# Patient Record
Sex: MALE | Race: WHITE | URBAN - METROPOLITAN AREA
[De-identification: names, ages, dates, MRNs, and addresses within clinical notes are randomized per-mention and may not be internally consistent; named-entity substitution may affect disease eponyms.]

---

## 2017-01-23 ENCOUNTER — OFFICE VISIT (OUTPATIENT)
Dept: DERMATOLOGY | Age: 47
End: 2017-01-23

## 2017-01-23 DIAGNOSIS — L57.0 AK (ACTINIC KERATOSIS): ICD-10-CM

## 2017-01-23 DIAGNOSIS — D22.9 MULTIPLE NEVI: Primary | ICD-10-CM

## 2017-01-23 PROCEDURE — 17003 DESTRUCT PREMALG LES 2-14: CPT | Performed by: DERMATOLOGY

## 2017-01-23 PROCEDURE — 17000 DESTRUCT PREMALG LESION: CPT | Performed by: DERMATOLOGY

## 2017-01-23 PROCEDURE — 99213 OFFICE O/P EST LOW 20 MIN: CPT | Performed by: DERMATOLOGY

## 2018-03-20 ENCOUNTER — TELEPHONE (OUTPATIENT)
Dept: DERMATOLOGY | Age: 48
End: 2018-03-20

## 2018-03-22 ENCOUNTER — OFFICE VISIT (OUTPATIENT)
Dept: DERMATOLOGY | Age: 48
End: 2018-03-22

## 2018-03-22 DIAGNOSIS — D48.5 NEOPLASM OF UNCERTAIN BEHAVIOR OF SKIN: Primary | ICD-10-CM

## 2018-03-22 DIAGNOSIS — L57.0 AK (ACTINIC KERATOSIS): ICD-10-CM

## 2018-03-22 PROCEDURE — 11100 PR BIOPSY OF SKIN LESION: CPT | Performed by: DERMATOLOGY

## 2018-03-22 PROCEDURE — 11101 PR BIOPSY, EACH ADDED LESION: CPT | Performed by: DERMATOLOGY

## 2018-03-22 PROCEDURE — 17000 DESTRUCT PREMALG LESION: CPT | Performed by: DERMATOLOGY

## 2018-03-22 NOTE — PROGRESS NOTES
[facility]  Veronica Almaguer Fraction  1970    50 y.o. male     Date of Visit: 3/22/2018    Chief Complaint: lesions  Chief Complaint   Patient presents with    Skin Lesion     lt upper arm x 3 + check scalp     Last seen: 1-2017    History of Present Illness:    1. Here for evaluation of 3 papules on the L upper arm that he noticed in the past few weeks. Asx. He believes that these are new. 2. He also has a rough lesion on the upper FH.  asx. Hx of AK in the past.    3. He notes a few lesions on the scalp that he would like to have checked. No hx of skin cancer. No family hx of melanoma. Review of Systems:  Gen: Feels well, good sense of health. Skin: No changing moles or lesions. Past Medical History, Family History, Surgical History, Medications and Allergies reviewed. History reviewed. No pertinent past medical history. History reviewed. No pertinent surgical history. No outpatient prescriptions have been marked as taking for the 3/22/18 encounter (Office Visit) with Emilee Gates MD.       No Known Allergies      Physical Examination     Gen, NAD  The following were examined and determined to be normal: Psych/Neuro, Scalp/hair, Conjunctivae/eyelids, Gums/teeth/lips, Neck    The following were examined and determined to be abnormal: face, LUE    L upper outer arm with 3 pinkish-tan dome-shaped papules  Upper central FH with faintly erythematous roughly scaled macule             Assessment and Plan     1. r/o irritated nevus - L upper arm (proximal - A)  r/o sk vs nevus - L upper arm (distal - B)  - 2 Shave biopsy performed after verbal consent obtained. Patient educated regarding risk of bleeding, infection, scar and educated on wound care. Skin cleansed with alcohol pad and site anesthetized with lido + epi. Aluminum chloride applied to site for hemostasis. Petrolatum ointment and bandage applied.   Specimen bottle labeled with patient information and site and specimen sent to [facility]. 2. AK - upper central FH - 1  - 1 lesion(s) on the above areas treated with liquid nitrogen x 2 cycles. Patient educated on risk of blister, hypopigmentation/scar and wound care.    - sun protection

## 2018-03-22 NOTE — PATIENT INSTRUCTIONS
thin film of white petrolatum (Vaseline©).  You do not need to cover the area, but can if you prefer.  Do NOT allow the site to become dry or crusted, or attempt to dry it out with rubbing alcohol or hydrogen peroxide.  Continue this regimen until the area is pink and healed. Depending on the size and location of your cryosurgery site, healing may take 2 to 4 weeks.  The area may continue to be pink for several weeks, and over the next few months may become darker or lighter than the surrounding skin. This may be a permanent change.

## 2018-04-04 ENCOUNTER — TELEPHONE (OUTPATIENT)
Dept: DERMATOLOGY | Age: 48
End: 2018-04-04

## 2019-01-24 ENCOUNTER — WALK IN (OUTPATIENT)
Dept: URGENT CARE | Age: 49
End: 2019-01-24

## 2019-01-24 VITALS
TEMPERATURE: 97.7 F | WEIGHT: 202 LBS | SYSTOLIC BLOOD PRESSURE: 128 MMHG | RESPIRATION RATE: 18 BRPM | BODY MASS INDEX: 28.92 KG/M2 | HEIGHT: 70 IN | DIASTOLIC BLOOD PRESSURE: 80 MMHG | HEART RATE: 76 BPM

## 2019-01-24 DIAGNOSIS — J20.8 ACUTE VIRAL BRONCHITIS: Primary | ICD-10-CM

## 2019-01-24 PROCEDURE — 99203 OFFICE O/P NEW LOW 30 MIN: CPT | Performed by: NURSE PRACTITIONER

## 2019-01-24 RX ORDER — BENZONATATE 100 MG/1
CAPSULE ORAL
Qty: 20 CAPSULE | Refills: 0 | Status: SHIPPED | OUTPATIENT
Start: 2019-01-24 | End: 2019-02-28 | Stop reason: ALTCHOICE

## 2019-01-24 RX ORDER — ALBUTEROL SULFATE 90 UG/1
AEROSOL, METERED RESPIRATORY (INHALATION)
Qty: 1 INHALER | Refills: 0 | Status: SHIPPED | OUTPATIENT
Start: 2019-01-24 | End: 2019-02-28 | Stop reason: ALTCHOICE

## 2019-01-24 ASSESSMENT — ENCOUNTER SYMPTOMS
SHORTNESS OF BREATH: 1
CHILLS: 0
SINUS PRESSURE: 0
GASTROINTESTINAL NEGATIVE: 1
WHEEZING: 0
FEVER: 0
COUGH: 1
CHEST TIGHTNESS: 1
SORE THROAT: 0
RHINORRHEA: 0
HEADACHES: 0
FATIGUE: 1

## 2019-02-28 ENCOUNTER — OFFICE VISIT (OUTPATIENT)
Dept: FAMILY MEDICINE | Age: 49
End: 2019-02-28

## 2019-02-28 VITALS
BODY MASS INDEX: 28.14 KG/M2 | HEART RATE: 68 BPM | WEIGHT: 201 LBS | SYSTOLIC BLOOD PRESSURE: 120 MMHG | HEIGHT: 71 IN | DIASTOLIC BLOOD PRESSURE: 90 MMHG

## 2019-02-28 DIAGNOSIS — R10.9 ABDOMINAL DISCOMFORT: Primary | ICD-10-CM

## 2019-02-28 DIAGNOSIS — Z12.11 COLON CANCER SCREENING: ICD-10-CM

## 2019-02-28 PROCEDURE — 99203 OFFICE O/P NEW LOW 30 MIN: CPT | Performed by: FAMILY MEDICINE

## 2019-02-28 ASSESSMENT — ENCOUNTER SYMPTOMS
RESPIRATORY NEGATIVE: 1
ROS GI COMMENTS: ABDOMINAL DISCOMFORT
NEUROLOGICAL NEGATIVE: 1
CONSTITUTIONAL NEGATIVE: 1

## 2019-02-28 ASSESSMENT — PATIENT HEALTH QUESTIONNAIRE - PHQ9
SUM OF ALL RESPONSES TO PHQ9 QUESTIONS 1 AND 2: 0
1. LITTLE INTEREST OR PLEASURE IN DOING THINGS: NOT AT ALL
2. FEELING DOWN, DEPRESSED OR HOPELESS: NOT AT ALL
SUM OF ALL RESPONSES TO PHQ9 QUESTIONS 1 AND 2: 0

## 2019-03-01 ENCOUNTER — LAB SERVICES (OUTPATIENT)
Dept: LAB | Age: 49
End: 2019-03-01

## 2019-03-01 DIAGNOSIS — R10.9 ABDOMINAL DISCOMFORT: ICD-10-CM

## 2019-03-01 LAB
CRP SERPL-MCNC: <0.3 MG/DL
ERYTHROCYTE [SEDIMENTATION RATE] IN BLOOD: 10 MM/HR (ref 0–20)

## 2019-03-01 PROCEDURE — 85652 RBC SED RATE AUTOMATED: CPT | Performed by: FAMILY MEDICINE

## 2019-03-01 PROCEDURE — 83014 H PYLORI DRUG ADMIN: CPT | Performed by: FAMILY MEDICINE

## 2019-03-01 PROCEDURE — 86140 C-REACTIVE PROTEIN: CPT | Performed by: FAMILY MEDICINE

## 2019-03-01 PROCEDURE — 82784 ASSAY IGA/IGD/IGG/IGM EACH: CPT | Performed by: FAMILY MEDICINE

## 2019-03-01 PROCEDURE — 36415 COLL VENOUS BLD VENIPUNCTURE: CPT | Performed by: FAMILY MEDICINE

## 2019-03-01 PROCEDURE — 83013 H PYLORI (C-13) BREATH: CPT | Performed by: FAMILY MEDICINE

## 2019-03-01 PROCEDURE — 83516 IMMUNOASSAY NONANTIBODY: CPT | Performed by: FAMILY MEDICINE

## 2019-03-02 LAB — UREA BREATH TEST QL: NEGATIVE

## 2019-03-03 LAB
IGA SERPL-MCNC: 280 MG/DL (ref 82–453)
TTG IGA SER-ACNC: 5 UNITS

## 2019-03-04 ENCOUNTER — TELEPHONE (OUTPATIENT)
Dept: FAMILY MEDICINE | Age: 49
End: 2019-03-04

## 2019-09-03 ENCOUNTER — TELEPHONE (OUTPATIENT)
Dept: FAMILY MEDICINE | Age: 49
End: 2019-09-03

## 2021-05-03 ENCOUNTER — OFFICE VISIT (OUTPATIENT)
Dept: DERMATOLOGY | Age: 51
End: 2021-05-03
Payer: COMMERCIAL

## 2021-05-03 VITALS — TEMPERATURE: 97.9 F

## 2021-05-03 DIAGNOSIS — Q82.5 NEVUS FLAMMEUS: ICD-10-CM

## 2021-05-03 DIAGNOSIS — L57.0 AK (ACTINIC KERATOSIS): ICD-10-CM

## 2021-05-03 DIAGNOSIS — L82.1 SEBORRHEIC KERATOSIS: ICD-10-CM

## 2021-05-03 DIAGNOSIS — D22.9 MULTIPLE NEVI: Primary | ICD-10-CM

## 2021-05-03 PROCEDURE — 99203 OFFICE O/P NEW LOW 30 MIN: CPT | Performed by: DERMATOLOGY

## 2021-05-03 NOTE — PROGRESS NOTES
today  - educ re si/sx/ABCD's of MM   educ sun protection - OTC sunscreen with SPF 30-50+ recommended and reviewed usage  encouraged skin check yearly (sooner if indicated), self checks    3.  Nevus flammeus - occipital scalp  - Reassured regarding benign nature and chronicity  -No treatment needed but call if any other symptoms of inflammation-itching, scaling, worsening erythema or papules appearing

## 2022-05-24 ENCOUNTER — OFFICE VISIT (OUTPATIENT)
Dept: DERMATOLOGY | Age: 52
End: 2022-05-24
Payer: COMMERCIAL

## 2022-05-24 DIAGNOSIS — D22.9 MULTIPLE NEVI: Primary | ICD-10-CM

## 2022-05-24 DIAGNOSIS — L57.0 AK (ACTINIC KERATOSIS): ICD-10-CM

## 2022-05-24 DIAGNOSIS — L60.3 NAIL DYSTROPHY: ICD-10-CM

## 2022-05-24 DIAGNOSIS — Q82.5 NEVUS FLAMMEUS: ICD-10-CM

## 2022-05-24 DIAGNOSIS — L82.1 SEBORRHEIC KERATOSIS: ICD-10-CM

## 2022-05-24 PROCEDURE — 99213 OFFICE O/P EST LOW 20 MIN: CPT | Performed by: DERMATOLOGY

## 2022-05-24 RX ORDER — SUMATRIPTAN 100 MG/1
TABLET, FILM COATED ORAL
COMMUNITY
Start: 2022-01-03

## 2022-05-24 NOTE — PROGRESS NOTES
[facility]  Any Pizano MD  [phone]      Sherrie Hernández  1970    46 y.o. male     Date of Visit: 5/24/2022    Chief Complaint: lesions, moles  Chief Complaint   Patient presents with    Skin Exam     FSE     HX: Multi Nevi, Neoplasm/skin     Last seen: 5-2021    History of Present Illness:    1. Here for evaluation of multiple asx pigmented lesions on the trunk and extremities, present for many years; no change in size/shape/color of any lesions; no bleeding lesions. He had a few lesions on the left upper arm biopsied in 2018 -both read as dermal nevi. 2. hx of AK on the upper FH + other in the past.  No concerns with the site or new concerns. 3. Previously noticed an erythematous area on the occipital scalp that his family noticed with shorter haircuts, c.w nevus flammeus. No concerns. 4. He notes a tan spot on the R cheekbone that he would like checked. Asx. No changes. Believes something in this area may have been biopsied or treated with Ln2 in the past but not sure if same exact spot. Notes reviewed - no bx done and no scar visible; only noted to have AK in 2018 on the R zygoma. 5. C/o changes in the nails x 6 mos. Denies new medications, no wet work or chemical exposure. No personal or family hx of psoriasis. No joint pains. No hx of skin cancer. No family hx of melanoma. Typically wears sunscreen/covers up but got a sunburn this weekend. Review of Systems:  Gen: Feels well, good sense of health. Skin: No changing moles or lesions. Past Medical History, Family History, Surgical History, Medications and Allergies reviewed. No past medical history on file. No past surgical history on file. Outpatient Medications Marked as Taking for the 5/24/22 encounter (Office Visit) with Abiel Carrera MD   Medication Sig Dispense Refill    SUMAtriptan (IMITREX) 100 MG tablet Take one tablet at onset migraine.   May repeat in two hours.  Not to exceed 2 tablets/24 hours.  multivitamin (THERAGRAN) per tablet Take 1 tablet by mouth daily.  dextromethorphan-guaiFENesin (MUCINEX DM)  MG per SR tablet Take 1 tablet by mouth daily. No Known Allergies      Physical Examination     Gen, NAD  Full skin exam was done today except for underwear covered areas    trunk and extremities with scattered brown macules and papules   No AK's  Occipital scalp scalp extending to the hairline is a triangle shaped erythematous blanchable patch; no other papules, pustules, scale or evidence of inflammation   R zygoma with subtle tan macule  fingersnails with distal onycholysis and onychoschizia  Toenails with focal mild onycholysis                        Assessment and Plan     1. Benign-appearing nevi and SK's  2. AK's - clear today  - educ re si/sx/ABCD's of MM   Cont sun protection - OTC sunscreen with SPF 30-50+ recommended and reviewed usage  encouraged skin check yearly (sooner if indicated), self checks    3. Nevus flammeus - occipital scalp  - Reassured regarding benign nature and chronicity  -No treatment needed but call if any other symptoms of inflammation-itching, scaling, worsening erythema or papules appearing    4. Nail dystophy - ?  Nail psoriasis  - ed chronicity  - reassured and discussed potential etiology  - advised trimming, no picking  - start topical OTC keratin trx - rescuerxx - but discussed expectations  - if worsening or any other developments c/w psoriasis or PsA, then rtn

## 2022-05-24 NOTE — PATIENT INSTRUCTIONS
CND RescueRxx keratin treatment  Biotin - a supplement that helps with nails      Protecting Yourself From the Sun    · Apply an over-the-counter broad spectrum water resistant sunscreen with an SPF of at least 30 to exposed areas of the skin. Dont forget the ears and lips! Remember to reapply sunscreen about every 2 hours and after swimming or sweating. · Wear sun protective clothing. Swim shirts (aka. rash guards) are a great idea and negates the need to reapply sunscreen in those areas.      · Seek the shade whenever possible especially between the hours of 10 am and 4 pm when the suns rays are the strongest.     · Avoid tanning beds  ·

## 2023-08-22 ENCOUNTER — OFFICE VISIT (OUTPATIENT)
Dept: DERMATOLOGY | Age: 53
End: 2023-08-22
Payer: COMMERCIAL

## 2023-08-22 DIAGNOSIS — L82.1 SEBORRHEIC KERATOSIS: ICD-10-CM

## 2023-08-22 DIAGNOSIS — Q82.5 NEVUS FLAMMEUS: ICD-10-CM

## 2023-08-22 DIAGNOSIS — L60.3 NAIL DYSTROPHY: ICD-10-CM

## 2023-08-22 DIAGNOSIS — L57.0 AK (ACTINIC KERATOSIS): ICD-10-CM

## 2023-08-22 DIAGNOSIS — D22.9 MULTIPLE NEVI: Primary | ICD-10-CM

## 2023-08-22 PROCEDURE — 1036F TOBACCO NON-USER: CPT | Performed by: DERMATOLOGY

## 2023-08-22 PROCEDURE — 99213 OFFICE O/P EST LOW 20 MIN: CPT | Performed by: DERMATOLOGY

## 2023-08-22 PROCEDURE — 3017F COLORECTAL CA SCREEN DOC REV: CPT | Performed by: DERMATOLOGY

## 2023-08-22 PROCEDURE — G8421 BMI NOT CALCULATED: HCPCS | Performed by: DERMATOLOGY

## 2023-08-22 PROCEDURE — G8427 DOCREV CUR MEDS BY ELIG CLIN: HCPCS | Performed by: DERMATOLOGY

## 2023-08-22 PROCEDURE — 17000 DESTRUCT PREMALG LESION: CPT | Performed by: DERMATOLOGY

## 2023-08-22 NOTE — PROGRESS NOTES
[facility]  Melanie Vora MD  [phone]      Shy Dominguez  1970    45 y.o. male     Date of Visit: 8/22/2023    Chief Complaint: lesions, moles  Chief Complaint   Patient presents with    Skin Exam     Last seen: 5-2022    History of Present Illness:    1. Here for evaluation of multiple asx pigmented lesions on the trunk and extremities, present for many years; no change in size/shape/color of any lesions; no bleeding lesions. He had a few lesions on the left upper arm biopsied in 2018 -both read as dermal nevi. 2. hx of AK on the upper FH + other in the past.  No concerns with this site but one new on the vertex scalp. Asx. 3. Previously noticed an erythematous area on the occipital scalp that his family noticed with shorter haircuts, c.w nevus flammeus. No concerns. 4. Lentigo on the R cheekbone that we have monitored. Asx. No changes. He previously noted that he thought something in this area may have been biopsied or treated with Ln2 in the past but not sure if same exact spot. Notes reviewed - no bx done and no scar visible; only noted to have AK in 2018 on the R zygoma. 5. C/o changes in the nails since early 2022. Denies new medications, no wet work or chemical exposure. No personal or family hx of psoriasis. No joint pains. Better with use of over-the-counter topical keratin treatment and vitamin. No hx of skin cancer. No family hx of melanoma. Typically wears sunscreen/covers up but got a sunburn this weekend. Review of Systems:  Gen: Feels well, good sense of health. Skin: No changing moles or lesions. Past Medical History, Family History, Surgical History, Medications and Allergies reviewed. History reviewed. No pertinent past medical history. History reviewed. No pertinent surgical history.     No outpatient medications have been marked as taking for the 8/22/23 encounter (Office Visit) with Mima Loaiza MD.

## 2024-08-26 ENCOUNTER — OFFICE VISIT (OUTPATIENT)
Dept: DERMATOLOGY | Age: 54
End: 2024-08-26
Payer: COMMERCIAL

## 2024-08-26 DIAGNOSIS — L57.0 AK (ACTINIC KERATOSIS): ICD-10-CM

## 2024-08-26 DIAGNOSIS — L81.4 LENTIGINES: ICD-10-CM

## 2024-08-26 DIAGNOSIS — L82.1 SEBORRHEIC KERATOSIS: ICD-10-CM

## 2024-08-26 DIAGNOSIS — T63.444A BEE STING REACTION, UNDETERMINED INTENT, INITIAL ENCOUNTER: ICD-10-CM

## 2024-08-26 DIAGNOSIS — Q82.5 NEVUS FLAMMEUS: ICD-10-CM

## 2024-08-26 DIAGNOSIS — D22.9 MULTIPLE NEVI: Primary | ICD-10-CM

## 2024-08-26 PROCEDURE — G8421 BMI NOT CALCULATED: HCPCS | Performed by: DERMATOLOGY

## 2024-08-26 PROCEDURE — G8427 DOCREV CUR MEDS BY ELIG CLIN: HCPCS | Performed by: DERMATOLOGY

## 2024-08-26 PROCEDURE — 1036F TOBACCO NON-USER: CPT | Performed by: DERMATOLOGY

## 2024-08-26 PROCEDURE — 17000 DESTRUCT PREMALG LESION: CPT | Performed by: DERMATOLOGY

## 2024-08-26 PROCEDURE — 99213 OFFICE O/P EST LOW 20 MIN: CPT | Performed by: DERMATOLOGY

## 2024-08-26 PROCEDURE — 3017F COLORECTAL CA SCREEN DOC REV: CPT | Performed by: DERMATOLOGY

## 2024-08-26 RX ORDER — CLOBETASOL PROPIONATE 0.5 MG/G
OINTMENT TOPICAL
Qty: 45 G | Refills: 0 | Status: SHIPPED | OUTPATIENT
Start: 2024-08-26

## 2024-08-26 NOTE — PROGRESS NOTES
medical history.    History reviewed. No pertinent surgical history.    Outpatient Medications Marked as Taking for the 8/26/24 encounter (Office Visit) with Joyce Hendricks MD   Medication Sig Dispense Refill    SUMAtriptan (IMITREX) 100 MG tablet       multivitamin (THERAGRAN) per tablet Take 1 tablet by mouth daily         No Known Allergies      Physical Examination     Gen, NAD  Full skin exam was done today except for underwear covered areas    trunk and extremities with scattered brown macules and papules   L nasal bridge with erythematous roughly scaled macule    Occipital scalp scalp extending to the hairline is a triangle shaped erythematous blanchable patch; no other papules, pustules, scale or evidence of inflammation   R zygoma with tan macule    R lateral lower leg with ill-defined erythema with central purpuric brightely erythematous patch and mild edema  No significant warmth  No vesicles or pustules                Assessment and Plan     1. Benign-appearing nevi and SK's  2. AK's - 1 on the L bridge of the nose  - Monitor for ABCD's of MM and si/sx of NMSC  Continue sun protection - OTC sunscreen with SPF 30-50+ recommended and reviewed usage  Encouraged skin check yearly (sooner if indicated), self checks  - 1  lesion(s) treated with liquid nitrogen with cryac or swab.  Treated with 2 cycles for 1-5 seconds each after consent from patient.   Patient educated on risk of blister, hypopigmentation/scar and wound care.  Tolerated well.     3. Nevus flammeus - occipital scalp  - Reassured regarding benign nature and chronicity  -No treatment needed but call if any other symptoms of inflammation-itching, scaling, worsening erythema or papules appearing    4. Lentigo - stable  - monitor    5. Insect sting reaction on the R ankle w erythema and edema  - ed si/sx of cellulitis - call if worsening pain, warmth, spreading redness, F/C  - start clobetasol bid, rest, ice, elevation

## 2024-12-23 ENCOUNTER — OFFICE VISIT (OUTPATIENT)
Dept: ORTHOPEDIC SURGERY | Age: 54
End: 2024-12-23
Payer: COMMERCIAL

## 2024-12-23 VITALS — BODY MASS INDEX: 26.32 KG/M2 | HEIGHT: 71 IN | WEIGHT: 188 LBS

## 2024-12-23 PROCEDURE — 3017F COLORECTAL CA SCREEN DOC REV: CPT | Performed by: PHYSICIAN ASSISTANT

## 2024-12-23 PROCEDURE — 1036F TOBACCO NON-USER: CPT | Performed by: PHYSICIAN ASSISTANT

## 2024-12-23 PROCEDURE — G8419 CALC BMI OUT NRM PARAM NOF/U: HCPCS | Performed by: PHYSICIAN ASSISTANT

## 2024-12-23 PROCEDURE — 99203 OFFICE O/P NEW LOW 30 MIN: CPT | Performed by: PHYSICIAN ASSISTANT

## 2024-12-23 PROCEDURE — G8428 CUR MEDS NOT DOCUMENT: HCPCS | Performed by: PHYSICIAN ASSISTANT

## 2024-12-23 PROCEDURE — G8484 FLU IMMUNIZE NO ADMIN: HCPCS | Performed by: PHYSICIAN ASSISTANT

## 2024-12-23 NOTE — PROGRESS NOTES
Chief Complaint    Hand Pain (Left 5th Finger )      History of Present Illness:  Mata Manzanares is a 54 y.o. male presents to the after-hours clinic with a new problem.  Patient here with a chief complaint of left fifth finger pain.  He states he does have a mass on it.  Mass is at his PIP joint.  No injury or trauma.  It only hurts when pressure applied over the region.       Medical History:  Patient's medications, allergies, past medical, surgical, social and family histories were reviewed and updated as appropriate.    Review of Systems:  Pertinent items are noted in HPI  Review of systems reviewed from Patient History Form dated on 12/23/2024 and available in the patient's chart under the Media tab.     Vital Signs:  Ht 1.803 m (5' 11\")   Wt 85.3 kg (188 lb)   BMI 26.22 kg/m²     General Exam:   Constitutional: Patient is adequately groomed with no evidence of malnutrition  DTRs: Deep tendon reflexes are intact  Mental Status: The patient is oriented to time, place and person.  The patient's mood and affect are appropriate.  Lymphatic: The lymphatic examination bilaterally reveals all areas to be without enlargement or induration.  Vascular: Examination reveals no swelling or calf tenderness.  Peripheral pulses are palpable and 2+.  Neurological: The patient has good coordination.  There is no weakness or sensory deficit.    Left fifth finger examination:    Inspection: Small palpable mass radial aspect fifth digit PIP joint    Palpation: No significant tender    Range of Motion: Full range of range of motion    Strength: 5/5  and pinch strength    Special Tests: No instability at DIP, PIP, or MCP joint    Skin: There are no rashes, ulcerations or lesions.    Gait: Normal    Reflex +2    Additional Comments:       Additional Examinations:         Right Upper Extremity:  Examination of the right upper extremity does not show any tenderness, deformity or injury.  Range of motion is unremarkable.

## 2025-08-26 ENCOUNTER — OFFICE VISIT (OUTPATIENT)
Age: 55
End: 2025-08-26
Payer: COMMERCIAL

## 2025-08-26 DIAGNOSIS — L81.4 LENTIGINES: ICD-10-CM

## 2025-08-26 DIAGNOSIS — D22.9 MULTIPLE NEVI: Primary | ICD-10-CM

## 2025-08-26 DIAGNOSIS — L71.9 ROSACEA: ICD-10-CM

## 2025-08-26 DIAGNOSIS — L57.0 AK (ACTINIC KERATOSIS): ICD-10-CM

## 2025-08-26 DIAGNOSIS — L82.1 SEBORRHEIC KERATOSIS: ICD-10-CM

## 2025-08-26 DIAGNOSIS — Q82.5 NEVUS FLAMMEUS: ICD-10-CM

## 2025-08-26 PROCEDURE — 99213 OFFICE O/P EST LOW 20 MIN: CPT | Performed by: DERMATOLOGY
